# Patient Record
Sex: FEMALE | Race: WHITE | NOT HISPANIC OR LATINO | Employment: UNEMPLOYED | URBAN - METROPOLITAN AREA
[De-identification: names, ages, dates, MRNs, and addresses within clinical notes are randomized per-mention and may not be internally consistent; named-entity substitution may affect disease eponyms.]

---

## 2018-09-10 ENCOUNTER — ANESTHESIA EVENT (OUTPATIENT)
Dept: SURGERY | Facility: AMBULATORY SURGERY CENTER | Age: 63
End: 2018-09-10

## 2018-09-11 ENCOUNTER — HOSPITAL ENCOUNTER (OUTPATIENT)
Facility: AMBULATORY SURGERY CENTER | Age: 63
Discharge: HOME | End: 2018-09-11
Attending: PLASTIC SURGERY | Admitting: PLASTIC SURGERY

## 2018-09-11 ENCOUNTER — ANESTHESIA (OUTPATIENT)
Dept: SURGERY | Facility: AMBULATORY SURGERY CENTER | Age: 63
End: 2018-09-11

## 2018-09-11 VITALS
TEMPERATURE: 97.7 F | DIASTOLIC BLOOD PRESSURE: 74 MMHG | OXYGEN SATURATION: 95 % | BODY MASS INDEX: 27.05 KG/M2 | RESPIRATION RATE: 16 BRPM | HEIGHT: 62 IN | SYSTOLIC BLOOD PRESSURE: 110 MMHG | WEIGHT: 147 LBS

## 2018-09-11 DIAGNOSIS — R52 PAIN: Primary | ICD-10-CM

## 2018-09-11 DIAGNOSIS — T88.59XA NAUSEA AFTER ANESTHESIA, INITIAL ENCOUNTER: ICD-10-CM

## 2018-09-11 DIAGNOSIS — B99.9 INFECTION: ICD-10-CM

## 2018-09-11 DIAGNOSIS — R11.0 NAUSEA AFTER ANESTHESIA, INITIAL ENCOUNTER: ICD-10-CM

## 2018-09-11 PROCEDURE — G8907 PT DOC NO EVENTS ON DISCHARG: HCPCS

## 2018-09-11 PROCEDURE — 36000146

## 2018-09-11 PROCEDURE — G8916 PT W IV AB GIVEN ON TIME: HCPCS

## 2018-09-11 RX ORDER — GABAPENTIN 300 MG/1
300 CAPSULE ORAL ONCE
Status: COMPLETED | OUTPATIENT
Start: 2018-09-11 | End: 2018-09-11

## 2018-09-11 RX ORDER — CEFAZOLIN SODIUM 1 G/3ML
INJECTION, POWDER, FOR SOLUTION INTRAMUSCULAR; INTRAVENOUS CONTINUOUS PRN
Status: DISCONTINUED | OUTPATIENT
Start: 2018-09-11 | End: 2018-09-11 | Stop reason: HOSPADM

## 2018-09-11 RX ORDER — OXYCODONE AND ACETAMINOPHEN 5; 325 MG/1; MG/1
2 TABLET ORAL
Status: DISCONTINUED | OUTPATIENT
Start: 2018-09-11 | End: 2018-09-12 | Stop reason: HOSPADM

## 2018-09-11 RX ORDER — HYDROCODONE BITARTRATE AND ACETAMINOPHEN 5; 325 MG/1; MG/1
1-2 TABLET ORAL EVERY 4 HOURS PRN
Qty: 30 TABLET | Refills: 0
Start: 2018-09-11

## 2018-09-11 RX ORDER — GLYCOPYRROLATE 0.2 MG/ML
INJECTION, SOLUTION INTRAMUSCULAR; INTRAVENOUS PRN
Status: DISCONTINUED | OUTPATIENT
Start: 2018-09-11 | End: 2018-09-11

## 2018-09-11 RX ORDER — FENTANYL CITRATE 50 UG/ML
25-50 INJECTION, SOLUTION INTRAMUSCULAR; INTRAVENOUS EVERY 5 MIN PRN
Status: DISCONTINUED | OUTPATIENT
Start: 2018-09-11 | End: 2018-09-12 | Stop reason: HOSPADM

## 2018-09-11 RX ORDER — PROPOFOL 10 MG/ML
INJECTION, EMULSION INTRAVENOUS CONTINUOUS PRN
Status: DISCONTINUED | OUTPATIENT
Start: 2018-09-11 | End: 2018-09-11

## 2018-09-11 RX ORDER — HYDROMORPHONE HYDROCHLORIDE 1 MG/ML
.3-.5 INJECTION, SOLUTION INTRAMUSCULAR; INTRAVENOUS; SUBCUTANEOUS EVERY 10 MIN PRN
Status: DISCONTINUED | OUTPATIENT
Start: 2018-09-11 | End: 2018-09-12 | Stop reason: HOSPADM

## 2018-09-11 RX ORDER — LIDOCAINE 40 MG/G
CREAM TOPICAL
Status: DISCONTINUED | OUTPATIENT
Start: 2018-09-11 | End: 2018-09-12 | Stop reason: HOSPADM

## 2018-09-11 RX ORDER — ONDANSETRON 4 MG/1
4 TABLET, ORALLY DISINTEGRATING ORAL EVERY 30 MIN PRN
Status: DISCONTINUED | OUTPATIENT
Start: 2018-09-11 | End: 2018-09-12 | Stop reason: HOSPADM

## 2018-09-11 RX ORDER — ONDANSETRON 4 MG/1
4 TABLET, ORALLY DISINTEGRATING ORAL
Status: DISCONTINUED | OUTPATIENT
Start: 2018-09-11 | End: 2018-09-12 | Stop reason: HOSPADM

## 2018-09-11 RX ORDER — HYDROXYZINE HYDROCHLORIDE 10 MG/1
10 TABLET, FILM COATED ORAL
Status: DISCONTINUED | OUTPATIENT
Start: 2018-09-11 | End: 2018-09-12 | Stop reason: HOSPADM

## 2018-09-11 RX ORDER — HYDROXYZINE PAMOATE 25 MG/1
25 CAPSULE ORAL EVERY 6 HOURS PRN
Qty: 30 CAPSULE | Refills: 0
Start: 2018-09-11

## 2018-09-11 RX ORDER — ONDANSETRON 2 MG/ML
4 INJECTION INTRAMUSCULAR; INTRAVENOUS EVERY 30 MIN PRN
Status: DISCONTINUED | OUTPATIENT
Start: 2018-09-11 | End: 2018-09-12 | Stop reason: HOSPADM

## 2018-09-11 RX ORDER — DEXAMETHASONE SODIUM PHOSPHATE 4 MG/ML
10 INJECTION, SOLUTION INTRA-ARTICULAR; INTRALESIONAL; INTRAMUSCULAR; INTRAVENOUS; SOFT TISSUE EVERY 6 HOURS
Status: DISCONTINUED | OUTPATIENT
Start: 2018-09-11 | End: 2018-09-12 | Stop reason: HOSPADM

## 2018-09-11 RX ORDER — CEFAZOLIN SODIUM 1 G/3ML
1 INJECTION, POWDER, FOR SOLUTION INTRAMUSCULAR; INTRAVENOUS SEE ADMIN INSTRUCTIONS
Status: DISCONTINUED | OUTPATIENT
Start: 2018-09-11 | End: 2018-09-12 | Stop reason: HOSPADM

## 2018-09-11 RX ORDER — EPHEDRINE SULFATE 50 MG/ML
INJECTION, SOLUTION INTRAMUSCULAR; INTRAVENOUS; SUBCUTANEOUS PRN
Status: DISCONTINUED | OUTPATIENT
Start: 2018-09-11 | End: 2018-09-11

## 2018-09-11 RX ORDER — NALOXONE HYDROCHLORIDE 0.4 MG/ML
.1-.4 INJECTION, SOLUTION INTRAMUSCULAR; INTRAVENOUS; SUBCUTANEOUS
Status: DISCONTINUED | OUTPATIENT
Start: 2018-09-11 | End: 2018-09-12 | Stop reason: HOSPADM

## 2018-09-11 RX ORDER — PROPOFOL 10 MG/ML
INJECTION, EMULSION INTRAVENOUS PRN
Status: DISCONTINUED | OUTPATIENT
Start: 2018-09-11 | End: 2018-09-11

## 2018-09-11 RX ORDER — BUPIVACAINE HYDROCHLORIDE AND EPINEPHRINE 2.5; 5 MG/ML; UG/ML
INJECTION, SOLUTION INFILTRATION; PERINEURAL PRN
Status: DISCONTINUED | OUTPATIENT
Start: 2018-09-11 | End: 2018-09-11 | Stop reason: HOSPADM

## 2018-09-11 RX ORDER — ACETAMINOPHEN 325 MG/1
975 TABLET ORAL ONCE
Status: COMPLETED | OUTPATIENT
Start: 2018-09-11 | End: 2018-09-11

## 2018-09-11 RX ORDER — CEFAZOLIN SODIUM 2 G/100ML
2 INJECTION, SOLUTION INTRAVENOUS
Status: COMPLETED | OUTPATIENT
Start: 2018-09-11 | End: 2018-09-11

## 2018-09-11 RX ORDER — GINSENG 100 MG
CAPSULE ORAL PRN
Status: DISCONTINUED | OUTPATIENT
Start: 2018-09-11 | End: 2018-09-11 | Stop reason: HOSPADM

## 2018-09-11 RX ORDER — ONDANSETRON 2 MG/ML
INJECTION INTRAMUSCULAR; INTRAVENOUS PRN
Status: DISCONTINUED | OUTPATIENT
Start: 2018-09-11 | End: 2018-09-11

## 2018-09-11 RX ORDER — CEPHALEXIN 500 MG/1
500 CAPSULE ORAL 2 TIMES DAILY
Qty: 14 CAPSULE | Refills: 0
Start: 2018-09-11 | End: 2018-09-18

## 2018-09-11 RX ORDER — SODIUM CHLORIDE, SODIUM LACTATE, POTASSIUM CHLORIDE, CALCIUM CHLORIDE 600; 310; 30; 20 MG/100ML; MG/100ML; MG/100ML; MG/100ML
INJECTION, SOLUTION INTRAVENOUS CONTINUOUS
Status: DISCONTINUED | OUTPATIENT
Start: 2018-09-11 | End: 2018-09-12 | Stop reason: HOSPADM

## 2018-09-11 RX ORDER — DIAZEPAM 10 MG
10 TABLET ORAL EVERY 12 HOURS PRN
Status: DISCONTINUED
Start: 2018-09-11 | End: 2018-09-12 | Stop reason: HOSPADM

## 2018-09-11 RX ORDER — OXYCODONE AND ACETAMINOPHEN 5; 325 MG/1; MG/1
1-2 TABLET ORAL EVERY 4 HOURS PRN
Qty: 30 TABLET | Refills: 0
Start: 2018-09-11

## 2018-09-11 RX ORDER — FENTANYL CITRATE 50 UG/ML
INJECTION, SOLUTION INTRAMUSCULAR; INTRAVENOUS PRN
Status: DISCONTINUED | OUTPATIENT
Start: 2018-09-11 | End: 2018-09-11

## 2018-09-11 RX ORDER — LIDOCAINE HYDROCHLORIDE 20 MG/ML
INJECTION, SOLUTION INFILTRATION; PERINEURAL PRN
Status: DISCONTINUED | OUTPATIENT
Start: 2018-09-11 | End: 2018-09-11

## 2018-09-11 RX ORDER — ONDANSETRON 4 MG/1
8 TABLET, ORALLY DISINTEGRATING ORAL EVERY 6 HOURS PRN
Qty: 8 TABLET | Refills: 0
Start: 2018-09-11

## 2018-09-11 RX ADMIN — CEFAZOLIN SODIUM 1 G: 2 INJECTION, SOLUTION INTRAVENOUS at 09:23

## 2018-09-11 RX ADMIN — CEFAZOLIN SODIUM 1 G: 2 INJECTION, SOLUTION INTRAVENOUS at 11:23

## 2018-09-11 RX ADMIN — Medication 1 MG: at 09:17

## 2018-09-11 RX ADMIN — GABAPENTIN 300 MG: 300 CAPSULE ORAL at 06:47

## 2018-09-11 RX ADMIN — ONDANSETRON 4 MG: 2 INJECTION INTRAMUSCULAR; INTRAVENOUS at 11:57

## 2018-09-11 RX ADMIN — Medication 150 MCG: at 10:29

## 2018-09-11 RX ADMIN — Medication 20 MG: at 08:40

## 2018-09-11 RX ADMIN — Medication 100 MCG: at 07:58

## 2018-09-11 RX ADMIN — SODIUM CHLORIDE, SODIUM LACTATE, POTASSIUM CHLORIDE, CALCIUM CHLORIDE: 600; 310; 30; 20 INJECTION, SOLUTION INTRAVENOUS at 12:13

## 2018-09-11 RX ADMIN — GLYCOPYRROLATE 0.1 MG: 0.2 INJECTION, SOLUTION INTRAMUSCULAR; INTRAVENOUS at 10:55

## 2018-09-11 RX ADMIN — Medication 100 MCG: at 10:34

## 2018-09-11 RX ADMIN — LIDOCAINE HYDROCHLORIDE 60 MG: 20 INJECTION, SOLUTION INFILTRATION; PERINEURAL at 07:30

## 2018-09-11 RX ADMIN — Medication 200 MCG: at 07:45

## 2018-09-11 RX ADMIN — SODIUM CHLORIDE, SODIUM LACTATE, POTASSIUM CHLORIDE, CALCIUM CHLORIDE: 600; 310; 30; 20 INJECTION, SOLUTION INTRAVENOUS at 10:46

## 2018-09-11 RX ADMIN — PROPOFOL 200 MCG/KG/MIN: 10 INJECTION, EMULSION INTRAVENOUS at 07:33

## 2018-09-11 RX ADMIN — Medication 100 MCG: at 10:23

## 2018-09-11 RX ADMIN — SODIUM CHLORIDE, SODIUM LACTATE, POTASSIUM CHLORIDE, CALCIUM CHLORIDE: 600; 310; 30; 20 INJECTION, SOLUTION INTRAVENOUS at 08:56

## 2018-09-11 RX ADMIN — EPHEDRINE SULFATE 10 MG: 50 INJECTION, SOLUTION INTRAMUSCULAR; INTRAVENOUS; SUBCUTANEOUS at 10:51

## 2018-09-11 RX ADMIN — DEXAMETHASONE SODIUM PHOSPHATE 10 MG: 4 INJECTION, SOLUTION INTRA-ARTICULAR; INTRALESIONAL; INTRAMUSCULAR; INTRAVENOUS; SOFT TISSUE at 07:39

## 2018-09-11 RX ADMIN — Medication 0.2 MCG/KG/MIN: at 08:32

## 2018-09-11 RX ADMIN — CEFAZOLIN SODIUM 1 G: 2 INJECTION, SOLUTION INTRAVENOUS at 12:25

## 2018-09-11 RX ADMIN — FENTANYL CITRATE 50 MCG: 50 INJECTION, SOLUTION INTRAMUSCULAR; INTRAVENOUS at 07:30

## 2018-09-11 RX ADMIN — SODIUM CHLORIDE, SODIUM LACTATE, POTASSIUM CHLORIDE, CALCIUM CHLORIDE: 600; 310; 30; 20 INJECTION, SOLUTION INTRAVENOUS at 07:42

## 2018-09-11 RX ADMIN — Medication 100 MCG: at 08:05

## 2018-09-11 RX ADMIN — EPHEDRINE SULFATE 10 MG: 50 INJECTION, SOLUTION INTRAMUSCULAR; INTRAVENOUS; SUBCUTANEOUS at 12:03

## 2018-09-11 RX ADMIN — PROPOFOL 200 MG: 10 INJECTION, EMULSION INTRAVENOUS at 07:30

## 2018-09-11 RX ADMIN — SODIUM CHLORIDE, SODIUM LACTATE, POTASSIUM CHLORIDE, CALCIUM CHLORIDE: 600; 310; 30; 20 INJECTION, SOLUTION INTRAVENOUS at 09:50

## 2018-09-11 RX ADMIN — CEFAZOLIN SODIUM 2 G: 2 INJECTION, SOLUTION INTRAVENOUS at 07:23

## 2018-09-11 RX ADMIN — EPHEDRINE SULFATE 10 MG: 50 INJECTION, SOLUTION INTRAMUSCULAR; INTRAVENOUS; SUBCUTANEOUS at 10:41

## 2018-09-11 RX ADMIN — FENTANYL CITRATE 50 MCG: 50 INJECTION, SOLUTION INTRAMUSCULAR; INTRAVENOUS at 07:45

## 2018-09-11 RX ADMIN — SODIUM CHLORIDE, SODIUM LACTATE, POTASSIUM CHLORIDE, CALCIUM CHLORIDE: 600; 310; 30; 20 INJECTION, SOLUTION INTRAVENOUS at 07:00

## 2018-09-11 RX ADMIN — Medication 40 MG: at 07:30

## 2018-09-11 RX ADMIN — ACETAMINOPHEN 975 MG: 325 TABLET ORAL at 06:47

## 2018-09-11 ASSESSMENT — LIFESTYLE VARIABLES: TOBACCO_USE: 1

## 2018-09-11 ASSESSMENT — COPD QUESTIONNAIRES: COPD: 0

## 2018-09-11 NOTE — ANESTHESIA CARE TRANSFER NOTE
Patient: Bonnie Perez    Procedure(s):  Tummy tuck with lipo suction, abdomen, hips, flanks, back - Wound Class: I-Clean   - Wound Class: I-Clean    Diagnosis: Cosmetic  Diagnosis Additional Information: No value filed.    Anesthesia Type:   General, ETT     Note:  Airway :Face Mask  Patient transferred to:PACU  Comments: Prior to extubation, oropharynx gently suctioned, awake extubation, good aeration, SpO2 100%, equal bilateral breath sounds.  Transported to PACU on O2 8lpm.  Patient awake, alert, SpO2 100% in PACU.  No apparent anesthesia complications.          Vitals: (Last set prior to Anesthesia Care Transfer)    CRNA VITALS  9/11/2018 1200 - 9/11/2018 1236      9/11/2018             Resp Rate (observed): -                Electronically Signed By: TEO Merritt CRNA  September 11, 2018  12:36 PM

## 2018-09-11 NOTE — ANESTHESIA PREPROCEDURE EVALUATION
Anesthesia Evaluation     . Pt has had prior anesthetic. Type: General    No history of anesthetic complications          ROS/MED HX    ENT/Pulmonary:     (+)tobacco use, Past use , . .   (-) asthma and COPD   Neurologic:      (-) CVA, TIA and Neuropathy   Cardiovascular:     (+) hypertension----. : . . . :. .      (-) CAD, irregular heartbeat/palpitations and stent   METS/Exercise Tolerance:     Hematologic:        (-) anemia   Musculoskeletal:   (+) , , other musculoskeletal- spinal stensis s/p surgery      GI/Hepatic:        (-) GERD and liver disease   Renal/Genitourinary:      (-) renal disease   Endo:     (+) thyroid problem .   (-) Type I DM and Type II DM   Psychiatric:         Infectious Disease:  - neg infectious disease ROS       Malignancy:         Other:                     Physical Exam  Normal systems: cardiovascular, pulmonary and dental    Airway   Mallampati: II  TM distance: >3 FB  Neck ROM: full    Dental     Cardiovascular   Rhythm and rate: regular and normal      Pulmonary    breath sounds clear to auscultation                    Anesthesia Plan      History & Physical Review  History and physical reviewed and following examination; no interval change.    ASA Status:  2 .        Plan for General and ETT with Intravenous induction. Maintenance will be TIVA.    PONV prophylaxis:  Ondansetron (or other 5HT-3) and Dexamethasone or Solumedrol       Postoperative Care      Consents  Anesthetic plan, risks, benefits and alternatives discussed with:  Patient..          Sergio Sullivan MD  Staff Anesthesiologist  7:17 AM September 11, 2018                     .

## 2018-09-11 NOTE — BRIEF OP NOTE
preop diagnosis: abdominal skin laxity and localized fat accumulation of abdomen, hips, flanks, back  Postop diagnosis: same  Operation: Abdominoplasty with liposuction of abdomen, hips, flanks, back  EBL: 85  Assist: nilay Redding  Findings: none  Complications: none  Specimens: none  Condition: extubated and stable to PAR    Refugio Fox MD

## 2018-09-11 NOTE — IP AVS SNAPSHOT
MRN:[id number]                      After Visit Summary   9/11/2018    Bonnie Perez    MRN: [id number]           Thank you!     Thank you for choosing [facility] for your care. Our goal is always to provide you with excellent care. Hearing back from our patients is one way we can continue to improve our services. Please take a few minutes to complete the written survey that you may receive in the mail after you visit with us. Thank you!        Patient Information     Date Of Birth          1955        About your hospital stay     You were admitted on:  September 11, 2018 You last received care in the:  [facility]    You were discharged on:  September 11, 2018       Who to Call     For medical emergencies, please call 911.  For non-urgent questions about your medical care, please call your primary care provider or clinic, None  For questions related to your surgery, please call your surgery clinic        Attending Provider     Provider Specialty    Refugio Fox MD Plastic Surgery       Primary Care Provider Fax #    Physician No Ref-Primary [phone]      After Care Instructions     Discharge Instructions       Resume pre procedure diet            Discharge Instructions       Lifting limit of  20 pounds for 2 weeks. Pt to remain flexed at waist at all times. Sleep in recliner type chair for 2 weeks            Discharge Instructions       Leave surgical garment in place until seen in clinic tomorrow            No Alcohol       No Alcohol for 24 hours post procedure            No Aspirin, Ibuprofen or Naproxen       No Aspirin, Ibuprofen or Naproxen products for 7 - 10 days following surgery            No driving or operating machinery       No driving or operating machinery until day after procedure                  Further instructions from your care team       [facility]  Same-Day Surgery   Adult Discharge Orders & Instructions   For 24  hours after surgery  1. Get plenty of rest.  A responsible adult must stay with you for at least 24 hours after you leave the hospital.   2. Do not drive or use heavy equipment.  If you have weakness or tingling, don't drive or use heavy equipment until this feeling goes away.  3. Do not drink alcohol.  4. Avoid strenuous or risky activities.  Ask for help when climbing stairs.   5. You may feel lightheaded.  IF so, sit for a few minutes before standing.  Have someone help you get up.   6. If you have nausea (feel sick to your stomach): Drink only clear liquids such as apple juice, ginger ale, broth or 7-Up.  Rest may also help.  Be sure to drink enough fluids.  Move to a regular diet as you feel able.  7. You may have a slight fever. Call the doctor if your fever is over 100 F (37.7 C) (taken under the tongue) or lasts longer than 24 hours.  8. You may have a dry mouth, a sore throat, muscle aches or trouble sleeping.  These should go away after 24 hours.  9. Do not make important or legal decisions.   Call your doctor for any of the followin.  Signs of infection (fever, growing tenderness at the surgery site, a large amount of drainage or bleeding, severe pain, foul-smelling drainage, redness, swelling).    2. It has been over 8 to 10 hours since surgery and you are still not able to urinate (pass water).    3.  Headache for over 24 hours.      To contact Dr Wright call:    [phone] - Day  [phone] - After hours pager      Tylenol was given at 6:45 AM.    Managing Your Pain at Home   Pain management is an important part of your care. When you are in pain or uncomfortable, it can affect the way you feel both physically and emotionally.   The longer pain goes untreated, the harder it is to relieve. Effective pain management can break the pain cycle.   When you take care of your pain before it becomes a problem you will:     Heal faster     Be more comfortable when walking and doing breathing exercises      Regain your strength faster    Your doctor may give you a prescription for pain medicine to take at home. Most pain medications to be taken at home are in pill form.   Your nurse will review the instructions for taking your pain medications. When taken by mouth, medication can take up to 30 minutes to be effective. Remember to take pain medication when your pain first begins  Remember, same day surgery does not mean same day recovery.  Healing is a gradual process.  It is normal to be impatient and feel discouraged while waiting for swelling, bruising, discomfort and numbness to diminish.  Allow yourself to be a patient!  Extra rest, a nutritious diet, and avoidance of stress are important aids to recovery.  Other Ways to Manage Pain   There are many ways besides medication to treat your pain. Ask your nurse or doctor for more information about:     Relaxation techniques     Guided imagery     Breathing exercises     Hot or cold packs     Massage     Changing position (elevation or support)     Using pillows or splints to protect incisions when coughing, laughing, etc.     Music   The goal is for you to be able to complete activities such as turning in bed, walking and doing deep breathing exercises with only mild to moderate pain.   Possible Side Effects of Pain Medications     Constipation     Sleepiness     Dry mouth     Nausea and/or vomiting   It is important for you to let your nurse or doctor know if you have any of these side effects.   What You Can Do to Help with the Side Effects     Drink as much fluid as possible     Eat foods high in fiber (beans, lentils, fruits)     Ask for medication if you continue to have problems with constipation     Suck on sugarless hard candy, or ice     Take pain medications with food     Peppermint can be helpful to decrease nausea       ankle pump exercises: This particular exercise is important because it helps decrease the swelling in the knee and lower leg.  It s  "also very important in helping you avoid developing blood clots in your lower leg(s) after surgery.   To do an ankle pump you point and flex your foot back and forth.  You should do 10 repetitions several times during the day. You really can t over do these.    Deep breathing and coughing:  It's important to learn deep breathing and coughing exercises as these will help to lower your risk of lung complications after your surgery.  Breathing deeply:  Moves air down to the bottom areas of the lungs   Opens air passages and moves mucous out (coughing is also easier)   Helps the blood and oxygen supply to your lungs, boosting circulation   Lowers the risk of lung complications such as pneumonia and infections  Breathe in deeply and slowly through your nose, expanding your lower rib cage, and letting your abdomen move forward. Hold for a count of 3 to 5. Breathe out slowly and completely.  Don't force your breath out. On the third breath, cough deeply from the lungs, not the throat.  Rest and repeat every hour while you are awake.        Additional Information     If you use hormonal birth control (such as the pill, patch, ring or implants): You'll need a second form of birth control for 7 days (condoms, a diaphragm or contraceptive foam). While in the hospital, you received a medicine called Bridion. Your normal birth control will not work as well for a week after taking this medicine.          Pending Results     No orders found from 9/9/2018 to 9/12/2018.            Admission Information     Date & Time Provider Department Dept. Phone    9/11/2018 Refugio Fox MD [facility] [phone]      Your Vitals Were     Blood Pressure Temperature Respirations Height Weight Pulse Oximetry    120/65 97.8  F (36.6  C) (Temporal) 16 1.575 m (5' 2\") 66.7 kg (147 lb) 100%    BMI (Body Mass Index)                   26.89 kg/m2           [employer] Information     [employer] lets you send messages to your doctor, " "view your test results, renew your prescriptions, schedule appointments and more. To sign up, go to [email or web address] . Click on \"Log in\" on the left side of the screen, which will take you to the Welcome page. Then click on \"Sign up Now\" on the right side of the page.     You will be asked to enter the access code listed below, as well as some personal information. Please follow the directions to create your username and password.     Your access code is: [email or web address]  Expires: 12/10/2018 12:38 PM     Your access code will  in 90 days. If you need help or a new code, please call your [facility] clinic or [phone].        Care EveryWhere ID     This is your Care EveryWhere ID. This could be used by other organizations to access your [facility] medical records  [id number]        Equal Access to Services     [facility]IDE [facility] : Vibha Summers, ronna her, wenceslao garcia, lynsey plunkett . So [facility] [phone].    ATENCIÓN: Si habla español, tiene a cloud disposición servicios gratuitos de asistencia lingüística. Llame al [phone].    We comply with applicable federal civil rights laws and [place] laws. We do not discriminate on the basis of race, color, national origin, age, disability, sex, sexual orientation, or gender identity.               Review of your medicines      UNREVIEWED medicines. Ask your doctor about these medicines        Dose / Directions    BENAZEPRIL HCL PO   Indication:  High Blood Pressure Disorder        Dose:  20 mg   Take 20 mg by mouth daily   Refills:  0       SYNTHROID PO        Dose:  150 mcg   Take 150 mcg by mouth   Refills:  0       XANAX PO   Indication:  Feeling Anxious        Dose:  0.25 mg   Take 0.25 mg by mouth as needed for anxiety   Refills:  0         START taking        Dose / Directions    cephALEXin 500 MG capsule   Commonly known as:  KEFLEX   Used for:  Infection        Dose:  500 mg   Take 1 capsule (500 " mg) by mouth 2 times daily for 7 days Start with 2 tabs this afternoon, one more at bedtime tonight. Filled as Duricef. Do not stop until last drain out   Quantity:  14 capsule   Refills:  0       HYDROcodone-acetaminophen 5-325 MG per tablet   Commonly known as:  NORCO   Used for:  Pain        Dose:  1-2 tablet   Take 1-2 tablets by mouth every 4 hours as needed for other (Moderate to Severe Pain) Use when not using percocet   Quantity:  30 tablet   Refills:  0       hydrOXYzine 25 MG capsule   Commonly known as:  VISTARIL   Used for:  Pain        Dose:  25 mg   Take 1 capsule (25 mg) by mouth every 6 hours as needed for itching or other Take one tablet with percocet or norco to enhance pain relief   Quantity:  30 capsule   Refills:  0       ondansetron 4 MG ODT tab   Commonly known as:  ZOFRAN-ODT   Used for:  Nausea after anesthesia, initial encounter        Dose:  8 mg   Take 2 tablets (8 mg) by mouth every 6 hours as needed for nausea   Quantity:  8 tablet   Refills:  0       oxyCODONE-acetaminophen 5-325 MG per tablet   Commonly known as:  PERCOCET   Used for:  Pain        Dose:  1-2 tablet   Take 1-2 tablets by mouth every 4 hours as needed for pain   Quantity:  30 tablet   Refills:  0            Where to get your medicines      Some of these will need a paper prescription and others can be bought over the counter. Ask your nurse if you have questions.     You don't need a prescription for these medications     cephALEXin 500 MG capsule    HYDROcodone-acetaminophen 5-325 MG per tablet    hydrOXYzine 25 MG capsule    ondansetron 4 MG ODT tab    oxyCODONE-acetaminophen 5-325 MG per tablet                Protect others around you: Learn how to safely use, store and throw away your medicines at www.disposemymeds.org.        ANTIBIOTIC INSTRUCTION     You've Been Prescribed an Antibiotic - Now What?  Your healthcare team thinks that you or your loved one might have an infection. Some infections can be treated with  antibiotics, which are powerful, life-saving drugs. Like all medications, antibiotics have side effects and should only be used when necessary. There are some important things you should know about your antibiotic treatment.      Your healthcare team may run tests before you start taking an antibiotic.    Your team may take samples (e.g., from your blood, urine or other areas) to run tests to look for bacteria. These test can be important to determine if you need an antibiotic at all and, if you do, which antibiotic will work best.      Within a few days, your healthcare team might change or even stop your antibiotic.    Your team may start you on an antibiotic while they are working to find out what is making you sick.    Your team might change your antibiotic because test results show that a different antibiotic would be better to treat your infection.    In some cases, once your team has more information, they learn that you do not need an antibiotic at all. They may find out that you don't have an infection, or that the antibiotic you're taking won't work against your infection. For example, an infection caused by a virus can't be treated with antibiotics. Staying on an antibiotic when you don't need it is more likely to be harmful than helpful.      You may experience side effects from your antibiotic.    Like all medications, antibiotics have side effects. Some of these can be serious.    Let you healthcare team know if you have any known allergies when you are admitted to the hospital.    One significant side effect of nearly all antibiotics is the risk of severe and sometimes deadly diarrhea caused by Clostridium difficile (C. Difficile). This occurs when a person takes antibiotics because some good germs are destroyed. Antibiotic use allows C. diificile to take over, putting patients at high risk for this serious infection.    As a patient or caregiver, it is important to understand your or your loved one's  antibiotic treatment. It is especially important for caregivers to speak up when patients can't speak for themselves. Here are some important questions to ask your healthcare team.    What infection is this antibiotic treating and how do you know I have that infection?    What side effects might occur from this antibiotic?    How long will I need to take this antibiotic?    Is it safe to take this antibiotic with other medications or supplements (e.g., vitamins) that I am taking?     Are there any special directions I need to know about taking this antibiotic? For example, should I take it with food?    How will I be monitored to know whether my infection is responding to the antibiotic?    What tests may help to make sure the right antibiotic is prescribed for me?      Information provided by:  www.cdc.gov/getsmart  U.S. Department of Health and Human Services  Centers for disease Control and Prevention  National Center for Emerging and Zoonotic Infectious Diseases  Division of Healthcare Quality Promotion        Information about OPIOIDS     PRESCRIPTION OPIOIDS: WHAT YOU NEED TO KNOW   We gave you an opioid (narcotic) pain medicine. It is important to manage your pain, but opioids are not always the best choice. You should first try all the other options your care team gave you. Take this medicine for as short a time (and as few doses) as possible.    Some activities can increase your pain, such as bandage changes or therapy sessions. It may help to take your pain medicine 30 to 60 minutes before these activities. Reduce your stress by getting enough sleep, working on hobbies you enjoy and practicing relaxation or meditation. Talk to your care team about ways to manage your pain beyond prescription opioids.    These medicines have risks:    DO NOT drive when on new or higher doses of pain medicine. These medicines can affect your alertness and reaction times, and you could be arrested for driving under the  influence (DUI). If you need to use opioids long-term, talk to your care team about driving.    DO NOT operate heavy machinery    DO NOT do any other dangerous activities while taking these medicines.    DO NOT drink any alcohol while taking these medicines.     If the opioid prescribed includes acetaminophen, DO NOT take with any other medicines that contain acetaminophen. Read all labels carefully. Look for the word  acetaminophen  or  Tylenol.  Ask your pharmacist if you have questions or are unsure.    You can get addicted to pain medicines, especially if you have a history of addiction (chemical, alcohol or substance dependence). Talk to your care team about ways to reduce this risk.    All opioids tend to cause constipation. Drink plenty of water and eat foods that have a lot of fiber, such as fruits, vegetables, prune juice, apple juice and high-fiber cereal. Take a laxative (Miralax, milk of magnesia, Colace, Senna) if you don t move your bowels at least every other day. Other side effects include upset stomach, sleepiness, dizziness, throwing up, tolerance (needing more of the medicine to have the same effect), physical dependence and slowed breathing.    Store your pills in a secure place, locked if possible. We will not replace any lost or stolen medicine. If you don t finish your medicine, please throw away (dispose) as directed by your pharmacist. The [place] Pollution Control Agency has more information about safe disposal: https://www.pca.[facility].[place].us/living-green/managing-unwanted-medications             Medication List: This is a list of all your medications and when to take them. Check marks below indicate your daily home schedule. Keep this list as a reference.      Medications           Morning Afternoon Evening Bedtime As Needed    BENAZEPRIL HCL PO   Take 20 mg by mouth daily                                cephALEXin 500 MG capsule   Commonly known as:  KEFLEX   Take 1 capsule (500 mg) by  mouth 2 times daily for 7 days Start with 2 tabs this afternoon, one more at bedtime tonight. Filled as Duricef. Do not stop until last drain out                                HYDROcodone-acetaminophen 5-325 MG per tablet   Commonly known as:  NORCO   Take 1-2 tablets by mouth every 4 hours as needed for other (Moderate to Severe Pain) Use when not using percocet                                hydrOXYzine 25 MG capsule   Commonly known as:  VISTARIL   Take 1 capsule (25 mg) by mouth every 6 hours as needed for itching or other Take one tablet with percocet or norco to enhance pain relief                                ondansetron 4 MG ODT tab   Commonly known as:  ZOFRAN-ODT   Take 2 tablets (8 mg) by mouth every 6 hours as needed for nausea                                oxyCODONE-acetaminophen 5-325 MG per tablet   Commonly known as:  PERCOCET   Take 1-2 tablets by mouth every 4 hours as needed for pain                                SYNTHROID PO   Take 150 mcg by mouth                                XANAX PO   Take 0.25 mg by mouth as needed for anxiety                                          More Information        Discharge Instructions: Caring for Your Augie-Holbrook Drainage Tube  Your doctor discharges you with a Augie-Holbrook drainage tube. Doctors commonly leave this drain within the abdominal cavity after surgery. It helps drain and collect blood and body fluid after surgery. This can prevent swelling and reduces the risk for infection. The tube is held in place by a few stitches. It is covered with a bandage. Your doctor will remove the drain when he or she determines you no longer need it.  Home care    Don t sleep on the same side as the tube.    Secure the tube and bag inside your clothing with a safety pin. This helps keep the tube from being pulled out.    Empty your drain at least twice a day. Empty it more often if the drain is full. Wash  and dry your hands before emptying the drain.  ? Lift the  opening on the drain.  ? Drain the fluid into a measuring cup.  ? Record the amount of fluid each time you empty the drain. Include the date and time it was emptied. Share this information with your doctor on your next visit.  ? Squeeze the bulb with your hands until you hear air coming out of the bulb if your doctor has instructed you to do so (sometimes the bulb is used as a reservoir without suction). Check with your doctor about specific drain instructions.  ? Close the opening.    Change the dressing around the tube every day.  ? Wash your hands.  ? Remove the old bandage.  ? Wash your hands again.  ? Wet a cotton swab and clean the skin around the incision and tube site. Use normal saline solution (salt and water). Or, you can use warm, soapy water.  ? Put a new bandage on the incision and tube site. Make the bandage large enough to cover the whole incision area.  ? Tape the bandage in place.    Keep the bandage and tube site dry when you shower. Ask your healthcare provider about the best way to do this.     Stripping  the tube helps keep blood clots from blocking the tube. Ask your nurse how often you should strip the tube. Stripping may not be needed, depending on where and why your doctor placed the tube. It may even be dangerous in some cases.   ? Hold the tubing where it leaves the skin, with one hand. This keeps it from pulling on the skin.  ? Pinch the tubing with the thumb and first finger of your other hand.  ? Slowly and firmly pull your thumb and first finger down the tubing. You may find it helpful to hold an alcohol swab between your fingers and the tube to lubricate the tubing.  ? If the pulling hurts or feels like it s coming out of the skin, stop. Begin again more gently.  Follow-up care  Make a follow-up appointment as directed by our staff.     When to seek medical care  Call your healthcare provider right away if you have any of the following:    New or increased pain around the  tube    Redness, swelling, or warmth around the incision or tube    Drainage that is foul-smelling    Vomiting    Fever of 100.4 F (38 C)    Fluid leaking around the tube    Incision seems not to be healing    Stitches become loose    Tube falls out or breaks    Drainage that changes from light pink to dark red    Blood clots in the drainage bulb    A sudden increase or decrease in the amount of drainage (over 30 mL)     Date Last Reviewed: 2/1/2017 2000-2017 [employer]. [address] [place] [address]. All rights reserved. This information is not intended as a substitute for professional medical care. Always follow your healthcare professional's instructions.

## 2018-09-11 NOTE — IP AVS SNAPSHOT
[facility]    [address] [place] [address]    Phone:  [phone]                                       After Visit Summary   9/11/2018    Bonnie Perez    MRN: [id number]           After Visit Summary Signature Page     I have received my discharge instructions, and my questions have been answered. I have discussed any challenges I see with this plan with the nurse or doctor.    ..........................................................................................................................................  Patient/Patient Representative Signature      ..........................................................................................................................................  Patient Representative Print Name and Relationship to Patient    ..................................................               ................................................  Date                                   Time    ..........................................................................................................................................  Reviewed by Signature/Title    ...................................................              ..............................................  Date                                               Time          22EPIC Rev 08/18

## 2018-09-11 NOTE — DISCHARGE INSTRUCTIONS
[facility]  Same-Day Surgery   Adult Discharge Orders & Instructions   For 24 hours after surgery  1. Get plenty of rest.  A responsible adult must stay with you for at least 24 hours after you leave the hospital.   2. Do not drive or use heavy equipment.  If you have weakness or tingling, don't drive or use heavy equipment until this feeling goes away.  3. Do not drink alcohol.  4. Avoid strenuous or risky activities.  Ask for help when climbing stairs.   5. You may feel lightheaded.  IF so, sit for a few minutes before standing.  Have someone help you get up.   6. If you have nausea (feel sick to your stomach): Drink only clear liquids such as apple juice, ginger ale, broth or 7-Up.  Rest may also help.  Be sure to drink enough fluids.  Move to a regular diet as you feel able.  7. You may have a slight fever. Call the doctor if your fever is over 100 F (37.7 C) (taken under the tongue) or lasts longer than 24 hours.  8. You may have a dry mouth, a sore throat, muscle aches or trouble sleeping.  These should go away after 24 hours.  9. Do not make important or legal decisions.   Call your doctor for any of the followin.  Signs of infection (fever, growing tenderness at the surgery site, a large amount of drainage or bleeding, severe pain, foul-smelling drainage, redness, swelling).    2. It has been over 8 to 10 hours since surgery and you are still not able to urinate (pass water).    3.  Headache for over 24 hours.      To contact Dr Wright call:    [phone] - Day  [phone] - After hours pager      Tylenol was given at 6:45 AM.    Managing Your Pain at Home   Pain management is an important part of your care. When you are in pain or uncomfortable, it can affect the way you feel both physically and emotionally.   The longer pain goes untreated, the harder it is to relieve. Effective pain management can break the pain cycle.   When you take care of your pain before it becomes  a problem you will:     Heal faster     Be more comfortable when walking and doing breathing exercises     Regain your strength faster    Your doctor may give you a prescription for pain medicine to take at home. Most pain medications to be taken at home are in pill form.   Your nurse will review the instructions for taking your pain medications. When taken by mouth, medication can take up to 30 minutes to be effective. Remember to take pain medication when your pain first begins  Remember, same day surgery does not mean same day recovery.  Healing is a gradual process.  It is normal to be impatient and feel discouraged while waiting for swelling, bruising, discomfort and numbness to diminish.  Allow yourself to be a patient!  Extra rest, a nutritious diet, and avoidance of stress are important aids to recovery.  Other Ways to Manage Pain   There are many ways besides medication to treat your pain. Ask your nurse or doctor for more information about:     Relaxation techniques     Guided imagery     Breathing exercises     Hot or cold packs     Massage     Changing position (elevation or support)     Using pillows or splints to protect incisions when coughing, laughing, etc.     Music   The goal is for you to be able to complete activities such as turning in bed, walking and doing deep breathing exercises with only mild to moderate pain.   Possible Side Effects of Pain Medications     Constipation     Sleepiness     Dry mouth     Nausea and/or vomiting   It is important for you to let your nurse or doctor know if you have any of these side effects.   What You Can Do to Help with the Side Effects     Drink as much fluid as possible     Eat foods high in fiber (beans, lentils, fruits)     Ask for medication if you continue to have problems with constipation     Suck on sugarless hard candy, or ice     Take pain medications with food     Peppermint can be helpful to decrease nausea       ankle pump exercises: This  particular exercise is important because it helps decrease the swelling in the knee and lower leg.  It s also very important in helping you avoid developing blood clots in your lower leg(s) after surgery.   To do an ankle pump you point and flex your foot back and forth.  You should do 10 repetitions several times during the day. You really can t over do these.    Deep breathing and coughing:  It's important to learn deep breathing and coughing exercises as these will help to lower your risk of lung complications after your surgery.  Breathing deeply:  Moves air down to the bottom areas of the lungs   Opens air passages and moves mucous out (coughing is also easier)   Helps the blood and oxygen supply to your lungs, boosting circulation   Lowers the risk of lung complications such as pneumonia and infections  Breathe in deeply and slowly through your nose, expanding your lower rib cage, and letting your abdomen move forward. Hold for a count of 3 to 5. Breathe out slowly and completely.  Don't force your breath out. On the third breath, cough deeply from the lungs, not the throat.  Rest and repeat every hour while you are awake.

## 2018-09-12 NOTE — OP NOTE
Procedure Date: 09/11/2018      PREOPERATIVE DIAGNOSES:     1.  Localized fat accumulation of abdomen, hips, flanks and back.   2.  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      POSTOPERATIVE DIAGNOSES:     1.  Localized fat accumulation of abdomen, hips, flanks and back.   2.  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   3.  Bilateral inguinal hernias repaired as incidental finding at surgery.      PROCEDURES PERFORMED:   1.  Suction-assisted lipectomy of abdomen, hips, flanks, and back.  Total suction-assisted lipectomy volume 3200 mL.  Wetting/tumescent solution utilized 4000 mL mixed as follows:  Each liter of lactated Ringer was placed 1.5 mL of 1:100,000 epinephrine and 10 mL of 1% plain Xylocaine.   2.  Abdominoplasty.  The abdominal skin excised weighed 1121 grams.      INDICATIONS:  The patient is a very pleasant and attractive 63-year-old female who presented to our clinic for consultation regarding body contour surgery.  We had a lengthy discussion with her regarding lipoabdominoplasty.  The patient understands that this operation is a combination of liposuction and abdominoplasty.  The patient was told that liposuction is a means of removing localized fat accumulation.  She understands the key to maintaining the result we obtain through this operation is a combination of diet and exercise.  The patient understands that it is essential she wear a compression garment for at least 4-5 weeks following the operation to aid in skin retraction.  The patient was shown the location of the incisions for liposuction including those at latissimus insertion and beneath her bra band in the mid axillary line.  She understands the incision for abdominoplasty will run from the crest of her hip following the groin crease across the top of the mons and up the opposite groin crease to the opposite crest of hip.  The  patient was told that the incisions for liposuction are approximately 1/4 inch in length and will take 1 year to soften and mature and fade to white.  She was taught how to properly take care of liposuction access incision using quarter-inch [place] of Vaseline and 3M Tegaderm or Nexcare bandage.  The patient was told in the areas where liposuction is performed, there will be diminished sensation, particularly light touch which will return to full sensation in 2 months' time.  The patient is 63 years of age, and I cannot predict how her skin will retract.  She was told that liposuction is not a treatment for stretch marks or cellulite.        With regard to abdominoplasty, the patient was shown the location of the incision for abdominoplasty will run from the crest of her hip following the groin crease across the top of the mons and up the opposite groin crease to the opposite crest of hip.  The patient will have 2 closed suction drains placed beneath the abdominoplasty flap which will exit the lateral aspects of the incision so as to leave no additional visible scars.  The drains are placed to diminish her chances of seroma formation.  The patient understands there will be a new incision for exit of her umbilicus within the abdominoplasty flap.  The patient was told the technique I will perform is known as SAFELipo liposuction as first described by Brian Faith MD from [place].  The technique uses a Morales basket cannula on nonsuction mode to loosen or separate the fat from the interstitial connective tissue.  The aspiration phase of SAFELipo liposuction is performed using curved Mladick cannulas which follow the natural curvature of a woman's body.      The patient was told the risks of the operation include chance of bleeding, infection, hematoma, seroma, hypertrophic scarring, wound healing problems, and remote possibility of avascular necrosis of the abdominoplasty flap.  Other risks include a chance of deep  vein thrombosis and pulmonary emboli as well as fat emboli which are remote possibilities.  Measurements we will use to diminish the chances of DVT and PE include the use of sequential compression stockings from the induction through the entire abdominoplasty portion of the operation including her stay in the recovery room.  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  We told her to dorsiflex her calf muscles and perform calf muscle pumps in her La-Z-Boy chair postoperatively also as a means of diminishing the chances of deep vein thrombosis and pulmonary embolus.  The patient was given a chance to ask questions and all were answered.  The patient was marked in the preoperative holding area.  The patient provides informed consent for the operation consisting of liposuction of the abdomen, hips, flanks, back and bra band area and abdominoplasty.      DESCRIPTION OF PROCEDURE AND FINDINGS:  The patient was taken to the operating room, placed in supine position on operating table.  A successful general endotracheal anesthetic was then induced.  It should be noted that the ambient room temperature in the operating room was elevated to approximately 75 degrees in an effort to keep her warm.  Other maneuvers utilized included an over-the-table Iram Hugger and intravenous fluid warmer.  At the custodial point of the operation prior to performing the abdominoplasty, the patient was also covered with a lower body Iram Hugger in another effort to keep her warm.  At the end of the case, the patient's body temperature was completely normal core temperature.      The patient was prepped and draped in routine sterile fashion for liposuction from her breast to her knees.  The lower extremities where the sequential compression stockings were in place were kept sterile using stockinette and sterile  Coban.  Timeout was called at 7:46 a.m.  Incisions were made within the [place] of the umbilicus and in the area below the abdominoplasty flap.  These incisions were made over the crest of each hip at her latissimus insertion and beneath her bra band in the mid axillary line.  Through these incisions, wetting solution was infiltrated into her upper abdomen where the abdominoplasty flap was to be raised and also in her right and left hip, flank, back and bra band areas.  It should be noted the patient received 2 grams of Ancef intravenously at the beginning of the case as well as 10 mg of Decadron intravenously.  The Ancef was repeated every 2 hours during the case at a dose of 1 gram.      The 5 mm Morales basket cannula was used to perform the separation phase of SAFELipo liposuction of the abdominoplasty flap.  I then performed the aspiration phase of SAFELipo liposuction using a combination of 4.0 and 3.0 mm curved Mladick cannulas.  A very even suction lipectomy was performed of her upper abdomen and this was tapered into her flanks on both the right and left sides.  Next I addressed her right hip and flank.  The patient was turned to a left lateral decubitus position.  Anesthesia placed an axillary roll in the patient's left axilla.  The right arm was brought over the top and placed over the left arm board on a pillow so as to avoid distraction of brachial plexus.  In this left lateral decubitus position, the patient's right hip, flank, back and bra band area were addressed.  This was accomplished using a combination of 4 and 3 mm curved Mladick cannulas.  A very even suction assisted lipectomy was performed on the patient's right-sided sites.  Liposuction access incisions were closed with 5-0 Monocryl suture in buried interrupted intercuticular fashion and 5-0 Prolene sutures in simple interrupted fashion.  The liposuction access incisions were dressed with bacitracin, Mastisol and 3M Tegaderm dressings.         The patient was then returned to a supine position on the operating room table, then turned to a left lateral decubitus position.  Again, an axillary roll was placed in the patient's left axilla.  The right hip, flank, back and bra band areas were then addressed using curved Mladick 4 and 3 mm cannulas which follow the patient's natural curves of her waistline and back curvature.  A very even suction lipectomy was performed on the patient's left hip, flank, back and bra band areas.  Liposuction access incisions were closed and dressed in identical fashion to that previously described.  The patient was then turned back to supine position on the operating room table.  At this point, A Sadler catheter was placed under sterile conditions for monitoring of her urine output and to decompress her bladder.  The patient was then reprepped and draped for the abdominoplasty portion of the operation.  At this point, a lower body Iram Hugger was placed as another measure of keeping her warm.      At this point, the abdominoplasty was performed.  Incision was made which ran from the crest of her right hip following the groin crease across the top of the mons pubis to the left groin crease and then to her left crest of hip.  The abdominal skin was lifted in a layer over the rectus abdominis fascia, taking care to leave a cobblestone layer of fat over the rectus abdominis fascia to preserve lymphatics and diminish her chances of seroma formation.  All perforating vessels were grasped using an insulated DeBakey guarded monopolar forceps and cauterized as I came upon them.  I dissected to the level of the umbilicus.  At the umbilicus, 2 double-prong skin hooks were placed at the inferior and superior aspects of the umbilicus and the umbilicus was incised in oval fashion.  The tenotomy scissors was used to dissect in blunt fashion to the rectus abdominis fascia.  A thin layer of fat was left over the stalk of the umbilicus for blood  supply.  The remainder of the dissection proceeded in superior fashion to the xiphoid.  Care was taken to measure 6 cm on each side of the midline at the umbilicus and then narrow my tunnel up to the xiphoid to preserve as many perforators as possible.        The remainder of dissection proceeded in superior fashion to the xiphoid.  Multiple perforators were preserved as I came down beneath the thoracic cage to ensure adequate blood supply to the abdominoplasty flap.  Discontinuous undermining was performed using the Elyse dissector.  Rectus abdominis plicating sutures were placed using 0 PDS suture in buried and figure-of-eight fashion starting at the xiphoid and running down to the umbilicus in running and locked fashion.  The suture was tied immediately above the umbilicus.  Inferior to the umbilicus, a new rectus abdominis plicating suture was placed using 0 PDS suture in figure-of-eight and buried fashion continuing my way down to the arcuate line in running and locking fashion where it was tied.  I then used 2-0 PDS sutures to bury my 0 PDS sutures.  The umbilicus was then tacked down to the rectus abdominis fascia using a total of four 3-0 PDS sutures.  Next, the Emanuel's fascia at the location of the mons was sutured down to the fascia of the abdominal wall to avoid having the mons hike in a superior direction.  The lateral aspects of the incision were also sutured down to the abdominal fascia as well.  The entire abdominoplasty flap was irrigated with a solution consisting of 1 liter of saline and 2 grams of Ancef.  Copious irrigation was performed.  I then injected the rectus abdominis fascia using a total of 10 mL of 0.25% Marcaine with 1:200,000 epinephrine.      The patient was then placed in a flexed position on the operating room table to correspond with a beach chair or semi-Moon position.  In this position, the abdominal skin excess was directed downward by means of 2 Kocher clamps.  A  horizontal incision was made to correspond with the incision over the mons pubis.  Lateral incisions were then incised as well.  Inset of the abdominoplasty flap was performed using 3-0 PDS sutures in the Emanuel's fascia layer.  A high lateral tension abdominoplasty closure was performed so that most of the tension was born to the lateral aspects of the flap rather than the center of the flap.  At this point, two #15 round fluted Varun drains were placed beneath the abdominoplasty flap by means of a 4 mm curved Mladick cannula.  I slipped the cannula through the lateral aspect of the incision and brought it under the flap then inserted the drain over the cannula and withdrew it through the lateral aspect of the incision.  The drains were tied in place at the lateral aspects of the incision using 2-0 silk suture in half-hitch fashion.  This was done on both the right and left sides.  The right-sided drain was brought over the umbilicus.  The left-sided drain was run approximately 2 cm beneath the incision line.        At this point, the midline abdominoplasty flap was approximated using a perforating towel clip to temporarily locate the umbilicus.  An oval incision was incised for exit of the umbilicus.  Plug of fat was removed beneath the umbilicus so as to have a concave area around the umbilicus to give a natural appearance.  The umbilicus was then inset suturing the hole for exit of the umbilicus to the abdominal wall using 3-0 PDS sutures in buried fashion.  Inset of the umbilicus itself was accomplished using 4-0 PDS sutures in buried interrupted deep dermal fashion.  The final skin closure of the umbilicus was run with 4-0 Monocryl suture in buried and running continuous intracuticular fashion and tied.  5-0 Prolene sutures were then placed to bury my Monocryl suture and to obtain exact coaptation of the skin at the umbilicus.  Inset of the center portion of the abdominoplasty flap was accomplished using 3-0  PDS sutures in the Emanuel's fascia layer.  Skin closure was accomplished using a combination of 3-0 and 4-0 PDS sutures placed in buried interrupted deep dermal fashion.  The final skin closure of the entire abdominoplasty flap was performed using 4-0 Monocryl suture in running and buried continuous intracuticular fashion.  Dressing consisted of Nitro-Bid paste over the abdominoplasty flap covered with sheets of Adaptic.  Additional compression was obtained using Epifoam in the flank and back areas.  The patient was placed in her compression garment on the operating room table.  The hospital bed was brought into the operating room, placed in beach chair position to correspond with the flexed position on the operating room table.  The patient was transferred from the operating room table to the hospital bed in that position.  Estimated blood loss 85 mL.      COMPLICATIONS:  None.      CONDITION:  Extubated and stable to postanesthesia recovery.        OPERATIVE TIMES:  Room time 7:26 a.m., timeout 7:46 a.m., start time 7:48 a.m., procedure ended at 12:19 p.m.  Patient was in recovery room at 12:30 p.m.  Actual operating room time was 4 hours 33 minutes, paid time 5 hours, including the additional half hour we add to each case.  Anesthesia time 5 hours 4 minutes.  It should be noted that alloted anesthesia time for turnover is 30 minutes or a total of 5 hours 30 minutes from time in the room until time in recovery room.  This was accomplished in 5 hours 4 minutes, so we were 26 minutes ahead of predicted time for the entire case of time in room to time in recovery room.  Actual surgical time was 4 hours 33 minutes.  This was 27 minutes faster than the paid time of 5 hours.  Predicted operating time 4 hours 30 minutes and we add additional 30 minutes to each case.      Total suction-assisted lipectomy volume 3200 mL.  Tumescent solution utilized 4000 mL mixed as follows:  In each liter of lactated Ringer was placed 1.5  mL of 1:1000 epinephrine and 10 mL of 1% Xylocaine plain.      IV FLUIDS:  4500 mL.      URINE OUTPUT:  340 mL.        SKIN WEIGHT:  1121 grams.         SERGEY BURNHAM MD             D: 2018   T: 2018   MT: CECELIA      Name:     ELIZABETH MCCANN   MRN:      [id number]        Account:        [id number]   :      1955           Procedure Date: 2018      Document: [id number]

## 2023-08-14 DIAGNOSIS — R06.09 DOE (DYSPNEA ON EXERTION): Primary | ICD-10-CM

## (undated) DEVICE — SUCTION TIP YANKAUER W/O VENT K86

## (undated) DEVICE — PACK MINOR SBA15MIFSE

## (undated) DEVICE — STPL SKIN 35W 054887

## (undated) DEVICE — SOL WATER IRRIG 1000ML BOTTLE 07139-09

## (undated) DEVICE — PIN SAFTY INF 1.5" STERILE SS C18700-020

## (undated) DEVICE — SU PDS II 4-0 P-3 18" Z494G

## (undated) DEVICE — DRAPE SHEET 3/4 78X60"

## (undated) DEVICE — DRAIN JACKSON PRATT CHANNEL 15FR ROUND HUBLESS SIL JP-2228

## (undated) DEVICE — ADH LIQUID MASTISOL TOPICAL VIAL 2-3ML 0523-48

## (undated) DEVICE — DRSG KERLIX 4 1/2"X4YDS ROLL 6715

## (undated) DEVICE — PREP CHLORAPREP 26ML TINTED ORANGE  260815

## (undated) DEVICE — GOWN XLG DISP 9545

## (undated) DEVICE — DECANTER TRANSFER DEVICE 2008S

## (undated) DEVICE — SPONGE LAP 18X18" 1515

## (undated) DEVICE — BNDG COBAN 6"X5YDS STERILE

## (undated) DEVICE — SU PDS II 3-0 PS-1 18" Z683G

## (undated) DEVICE — SU PDS II 0 CT-2 27" Z334H

## (undated) DEVICE — PACK SET-UP STD 9102

## (undated) DEVICE — ESU ELEC BLADE 2.75" COATED/INSULATED E1455

## (undated) DEVICE — Device

## (undated) DEVICE — SYR BULB IRRIG DOVER 60 ML LATEX FREE 67000

## (undated) DEVICE — SU MONOCRYL 5-0 P-3 18" UND Y493G

## (undated) DEVICE — BLADE KNIFE SURG 10 371110

## (undated) DEVICE — SU PDS II 2-0 SH 27" Z317H

## (undated) DEVICE — DRAPE SPLIT EENT 76X124" 3X28" 9447

## (undated) DEVICE — GLOVE PROTEXIS BLUE W/NEU-THERA 7.0  2D73EB70

## (undated) DEVICE — SU SILK 2-0 FSL 18" 677G

## (undated) DEVICE — DRAPE STOCKINETTE IMPERVIOUS 12" 1587

## (undated) DEVICE — SU PROLENE 5-0 P-3 18" 8698G

## (undated) DEVICE — GLOVE PROTEXIS W/NEU-THERA 6.5  2D73TE65

## (undated) DEVICE — SUCTION CANISTER MEDIVAC LINER 1500ML W/LID 65651-515

## (undated) DEVICE — TUBING INFUSION INFILTRATION LIPOSUCTION 156" 24-6008

## (undated) DEVICE — ESU NDL COLORADO MICRO 3CM STR N103A

## (undated) DEVICE — DRAIN JACKSON PRATT RESERVOIR 100ML SU130-1305

## (undated) DEVICE — DRSG TEGADERM 2 3/8X2 3/4" 1624W

## (undated) DEVICE — DRSG ADAPTIC 3X8" 6113

## (undated) DEVICE — BLADE KNIFE SURG 15 371115

## (undated) DEVICE — DRAPE SHEET HALF 40X60" 9358

## (undated) DEVICE — SU MONOCRYL 4-0 P-3 18" UND Y494G

## (undated) DEVICE — GLOVE PROTEXIS W/NEU-THERA 7.5  2D73TE75

## (undated) DEVICE — SUCTION TUBING 10' N1010

## (undated) DEVICE — SU MONOCRYL 4-0 PS-2 18" UND Y496G

## (undated) RX ORDER — EPHEDRINE SULFATE 50 MG/ML
INJECTION, SOLUTION INTRAVENOUS
Status: DISPENSED
Start: 2018-09-11

## (undated) RX ORDER — CEFAZOLIN SODIUM 2 G/100ML
INJECTION, SOLUTION INTRAVENOUS
Status: DISPENSED
Start: 2018-09-11

## (undated) RX ORDER — GABAPENTIN 300 MG/1
CAPSULE ORAL
Status: DISPENSED
Start: 2018-09-11

## (undated) RX ORDER — GINSENG 100 MG
CAPSULE ORAL
Status: DISPENSED
Start: 2018-09-11

## (undated) RX ORDER — HYDROMORPHONE HYDROCHLORIDE 2 MG/ML
INJECTION, SOLUTION INTRAMUSCULAR; INTRAVENOUS; SUBCUTANEOUS
Status: DISPENSED
Start: 2018-09-11

## (undated) RX ORDER — ONDANSETRON 2 MG/ML
INJECTION INTRAMUSCULAR; INTRAVENOUS
Status: DISPENSED
Start: 2018-09-11

## (undated) RX ORDER — EPINEPHRINE 1 MG/ML
INJECTION, SOLUTION INTRAMUSCULAR; SUBCUTANEOUS
Status: DISPENSED
Start: 2018-09-11

## (undated) RX ORDER — BUPIVACAINE HYDROCHLORIDE 2.5 MG/ML
INJECTION, SOLUTION EPIDURAL; INFILTRATION; INTRACAUDAL
Status: DISPENSED
Start: 2018-09-11

## (undated) RX ORDER — ACETAMINOPHEN 325 MG/1
TABLET ORAL
Status: DISPENSED
Start: 2018-09-11

## (undated) RX ORDER — LIDOCAINE HYDROCHLORIDE 10 MG/ML
INJECTION, SOLUTION EPIDURAL; INFILTRATION; INTRACAUDAL; PERINEURAL
Status: DISPENSED
Start: 2018-09-11

## (undated) RX ORDER — CEFAZOLIN SODIUM 1 G/3ML
INJECTION, POWDER, FOR SOLUTION INTRAMUSCULAR; INTRAVENOUS
Status: DISPENSED
Start: 2018-09-11

## (undated) RX ORDER — PROPOFOL 10 MG/ML
INJECTION, EMULSION INTRAVENOUS
Status: DISPENSED
Start: 2018-09-11

## (undated) RX ORDER — LIDOCAINE HYDROCHLORIDE 20 MG/ML
INJECTION, SOLUTION EPIDURAL; INFILTRATION; INTRACAUDAL; PERINEURAL
Status: DISPENSED
Start: 2018-09-11

## (undated) RX ORDER — GLYCOPYRROLATE 0.2 MG/ML
INJECTION INTRAMUSCULAR; INTRAVENOUS
Status: DISPENSED
Start: 2018-09-11

## (undated) RX ORDER — FUROSEMIDE 10 MG/ML
INJECTION INTRAMUSCULAR; INTRAVENOUS
Status: DISPENSED
Start: 2018-09-11

## (undated) RX ORDER — FENTANYL CITRATE 50 UG/ML
INJECTION, SOLUTION INTRAMUSCULAR; INTRAVENOUS
Status: DISPENSED
Start: 2018-09-11